# Patient Record
Sex: MALE | Race: WHITE | NOT HISPANIC OR LATINO
[De-identification: names, ages, dates, MRNs, and addresses within clinical notes are randomized per-mention and may not be internally consistent; named-entity substitution may affect disease eponyms.]

---

## 2017-11-17 PROBLEM — Z00.00 ENCOUNTER FOR PREVENTIVE HEALTH EXAMINATION: Status: ACTIVE | Noted: 2017-11-17

## 2017-11-27 ENCOUNTER — APPOINTMENT (OUTPATIENT)
Dept: INTERNAL MEDICINE | Facility: CLINIC | Age: 27
End: 2017-11-27

## 2019-05-09 ENCOUNTER — INPATIENT (INPATIENT)
Facility: HOSPITAL | Age: 29
LOS: 0 days | Discharge: LEFT BEFORE DISCHARGE | End: 2019-05-10
Attending: INTERNAL MEDICINE | Admitting: INTERNAL MEDICINE

## 2019-05-09 VITALS
RESPIRATION RATE: 16 BRPM | HEIGHT: 64 IN | TEMPERATURE: 99 F | SYSTOLIC BLOOD PRESSURE: 135 MMHG | HEART RATE: 95 BPM | DIASTOLIC BLOOD PRESSURE: 59 MMHG | WEIGHT: 130.07 LBS

## 2019-05-09 DIAGNOSIS — F11.20 OPIOID DEPENDENCE, UNCOMPLICATED: ICD-10-CM

## 2019-05-09 DIAGNOSIS — F32.9 MAJOR DEPRESSIVE DISORDER, SINGLE EPISODE, UNSPECIFIED: ICD-10-CM

## 2019-05-09 DIAGNOSIS — F17.200 NICOTINE DEPENDENCE, UNSPECIFIED, UNCOMPLICATED: ICD-10-CM

## 2019-05-09 LAB
ALBUMIN SERPL ELPH-MCNC: 4.4 G/DL — SIGNIFICANT CHANGE UP (ref 3.5–5.2)
ALP SERPL-CCNC: 96 U/L — SIGNIFICANT CHANGE UP (ref 30–115)
ALT FLD-CCNC: 12 U/L — SIGNIFICANT CHANGE UP (ref 0–41)
AMPHET UR-MCNC: NEGATIVE — SIGNIFICANT CHANGE UP
ANION GAP SERPL CALC-SCNC: 11 MMOL/L — SIGNIFICANT CHANGE UP (ref 7–14)
APPEARANCE UR: CLEAR — SIGNIFICANT CHANGE UP
AST SERPL-CCNC: 15 U/L — SIGNIFICANT CHANGE UP (ref 0–41)
BACTERIA # UR AUTO: ABNORMAL
BARBITURATES UR SCN-MCNC: NEGATIVE — SIGNIFICANT CHANGE UP
BASOPHILS # BLD AUTO: 0.02 K/UL — SIGNIFICANT CHANGE UP (ref 0–0.2)
BASOPHILS NFR BLD AUTO: 0.3 % — SIGNIFICANT CHANGE UP (ref 0–1)
BENZODIAZ UR-MCNC: NEGATIVE — SIGNIFICANT CHANGE UP
BILIRUB SERPL-MCNC: 0.4 MG/DL — SIGNIFICANT CHANGE UP (ref 0.2–1.2)
BILIRUB UR-MCNC: ABNORMAL
BUN SERPL-MCNC: 13 MG/DL — SIGNIFICANT CHANGE UP (ref 10–20)
CALCIUM SERPL-MCNC: 9.5 MG/DL — SIGNIFICANT CHANGE UP (ref 8.5–10.1)
CHLORIDE SERPL-SCNC: 102 MMOL/L — SIGNIFICANT CHANGE UP (ref 98–110)
CHOLEST SERPL-MCNC: 167 MG/DL — SIGNIFICANT CHANGE UP (ref 100–200)
CO2 SERPL-SCNC: 28 MMOL/L — SIGNIFICANT CHANGE UP (ref 17–32)
COCAINE METAB.OTHER UR-MCNC: NEGATIVE — SIGNIFICANT CHANGE UP
COLOR SPEC: YELLOW — SIGNIFICANT CHANGE UP
COMMENT - URINE: SIGNIFICANT CHANGE UP
CREAT SERPL-MCNC: 0.8 MG/DL — SIGNIFICANT CHANGE UP (ref 0.7–1.5)
DIFF PNL FLD: NEGATIVE — SIGNIFICANT CHANGE UP
DRUG SCREEN 1, URINE RESULT: SIGNIFICANT CHANGE UP
EOSINOPHIL # BLD AUTO: 0.09 K/UL — SIGNIFICANT CHANGE UP (ref 0–0.7)
EOSINOPHIL NFR BLD AUTO: 1.2 % — SIGNIFICANT CHANGE UP (ref 0–8)
EPI CELLS # UR: ABNORMAL /HPF
GGT SERPL-CCNC: 16 U/L — SIGNIFICANT CHANGE UP (ref 1–40)
GLUCOSE SERPL-MCNC: 92 MG/DL — SIGNIFICANT CHANGE UP (ref 70–99)
GLUCOSE UR QL: NEGATIVE MG/DL — SIGNIFICANT CHANGE UP
HCT VFR BLD CALC: 42.5 % — SIGNIFICANT CHANGE UP (ref 42–52)
HDLC SERPL-MCNC: 43 MG/DL — SIGNIFICANT CHANGE UP
HGB BLD-MCNC: 14.3 G/DL — SIGNIFICANT CHANGE UP (ref 14–18)
IMM GRANULOCYTES NFR BLD AUTO: 0.3 % — SIGNIFICANT CHANGE UP (ref 0.1–0.3)
KETONES UR-MCNC: ABNORMAL
LEUKOCYTE ESTERASE UR-ACNC: NEGATIVE — SIGNIFICANT CHANGE UP
LIPID PNL WITH DIRECT LDL SERPL: 110 MG/DL — SIGNIFICANT CHANGE UP (ref 4–129)
LYMPHOCYTES # BLD AUTO: 2.1 K/UL — SIGNIFICANT CHANGE UP (ref 1.2–3.4)
LYMPHOCYTES # BLD AUTO: 27 % — SIGNIFICANT CHANGE UP (ref 20.5–51.1)
MAGNESIUM SERPL-MCNC: 2.3 MG/DL — SIGNIFICANT CHANGE UP (ref 1.8–2.4)
MCHC RBC-ENTMCNC: 29.1 PG — SIGNIFICANT CHANGE UP (ref 27–31)
MCHC RBC-ENTMCNC: 33.6 G/DL — SIGNIFICANT CHANGE UP (ref 32–37)
MCV RBC AUTO: 86.4 FL — SIGNIFICANT CHANGE UP (ref 80–94)
METHADONE UR-MCNC: NEGATIVE — SIGNIFICANT CHANGE UP
MONOCYTES # BLD AUTO: 0.48 K/UL — SIGNIFICANT CHANGE UP (ref 0.1–0.6)
MONOCYTES NFR BLD AUTO: 6.2 % — SIGNIFICANT CHANGE UP (ref 1.7–9.3)
NEUTROPHILS # BLD AUTO: 5.07 K/UL — SIGNIFICANT CHANGE UP (ref 1.4–6.5)
NEUTROPHILS NFR BLD AUTO: 65 % — SIGNIFICANT CHANGE UP (ref 42.2–75.2)
NITRITE UR-MCNC: NEGATIVE — SIGNIFICANT CHANGE UP
NRBC # BLD: 0 /100 WBCS — SIGNIFICANT CHANGE UP (ref 0–0)
OPIATES UR-MCNC: POSITIVE
PCP UR-MCNC: NEGATIVE — SIGNIFICANT CHANGE UP
PH UR: 6 — SIGNIFICANT CHANGE UP (ref 5–8)
PLATELET # BLD AUTO: 323 K/UL — SIGNIFICANT CHANGE UP (ref 130–400)
POTASSIUM SERPL-MCNC: 4.4 MMOL/L — SIGNIFICANT CHANGE UP (ref 3.5–5)
POTASSIUM SERPL-SCNC: 4.4 MMOL/L — SIGNIFICANT CHANGE UP (ref 3.5–5)
PROPOXYPHENE QUALITATIVE URINE RESULT: NEGATIVE — SIGNIFICANT CHANGE UP
PROT SERPL-MCNC: 6.6 G/DL — SIGNIFICANT CHANGE UP (ref 6–8)
PROT UR-MCNC: 30 MG/DL
RBC # BLD: 4.92 M/UL — SIGNIFICANT CHANGE UP (ref 4.7–6.1)
RBC # FLD: 12.1 % — SIGNIFICANT CHANGE UP (ref 11.5–14.5)
RBC CASTS # UR COMP ASSIST: NEGATIVE — SIGNIFICANT CHANGE UP
SODIUM SERPL-SCNC: 141 MMOL/L — SIGNIFICANT CHANGE UP (ref 135–146)
SP GR SPEC: >=1.03 (ref 1.01–1.03)
THC UR QL: NEGATIVE — SIGNIFICANT CHANGE UP
TOTAL CHOLESTEROL/HDL RATIO MEASUREMENT: 3.9 RATIO — LOW (ref 4–5.5)
TRIGL SERPL-MCNC: 115 MG/DL — SIGNIFICANT CHANGE UP (ref 10–149)
UROBILINOGEN FLD QL: 1 MG/DL (ref 0.2–0.2)
WBC # BLD: 7.78 K/UL — SIGNIFICANT CHANGE UP (ref 4.8–10.8)
WBC # FLD AUTO: 7.78 K/UL — SIGNIFICANT CHANGE UP (ref 4.8–10.8)
WBC UR QL: SIGNIFICANT CHANGE UP /HPF

## 2019-05-09 RX ORDER — ACETAMINOPHEN 500 MG
650 TABLET ORAL EVERY 4 HOURS
Refills: 0 | Status: DISCONTINUED | OUTPATIENT
Start: 2019-05-09 | End: 2019-05-10

## 2019-05-09 RX ORDER — METHADONE HYDROCHLORIDE 40 MG/1
TABLET ORAL
Refills: 0 | Status: DISCONTINUED | OUTPATIENT
Start: 2019-05-09 | End: 2019-05-10

## 2019-05-09 RX ORDER — METHADONE HYDROCHLORIDE 40 MG/1
15 TABLET ORAL EVERY 12 HOURS
Refills: 0 | Status: DISCONTINUED | OUTPATIENT
Start: 2019-05-09 | End: 2019-05-10

## 2019-05-09 RX ORDER — METHADONE HYDROCHLORIDE 40 MG/1
15 TABLET ORAL ONCE
Refills: 0 | Status: DISCONTINUED | OUTPATIENT
Start: 2019-05-09 | End: 2019-05-09

## 2019-05-09 RX ORDER — NICOTINE POLACRILEX 2 MG
1 GUM BUCCAL DAILY
Refills: 0 | Status: DISCONTINUED | OUTPATIENT
Start: 2019-05-09 | End: 2019-05-10

## 2019-05-09 RX ORDER — METHADONE HYDROCHLORIDE 40 MG/1
5 TABLET ORAL EVERY 12 HOURS
Refills: 0 | Status: CANCELLED | OUTPATIENT
Start: 2019-05-12 | End: 2019-05-10

## 2019-05-09 RX ORDER — METHOCARBAMOL 500 MG/1
500 TABLET, FILM COATED ORAL EVERY 6 HOURS
Refills: 0 | Status: DISCONTINUED | OUTPATIENT
Start: 2019-05-09 | End: 2019-05-10

## 2019-05-09 RX ORDER — IBUPROFEN 200 MG
400 TABLET ORAL EVERY 6 HOURS
Refills: 0 | Status: DISCONTINUED | OUTPATIENT
Start: 2019-05-09 | End: 2019-05-10

## 2019-05-09 RX ORDER — MULTIVIT-MIN/FERROUS GLUCONATE 9 MG/15 ML
1 LIQUID (ML) ORAL DAILY
Refills: 0 | Status: DISCONTINUED | OUTPATIENT
Start: 2019-05-09 | End: 2019-05-10

## 2019-05-09 RX ORDER — HYDROXYZINE HCL 10 MG
100 TABLET ORAL AT BEDTIME
Refills: 0 | Status: DISCONTINUED | OUTPATIENT
Start: 2019-05-09 | End: 2019-05-10

## 2019-05-09 RX ORDER — HYDROXYZINE HCL 10 MG
50 TABLET ORAL EVERY 6 HOURS
Refills: 0 | Status: DISCONTINUED | OUTPATIENT
Start: 2019-05-09 | End: 2019-05-10

## 2019-05-09 RX ORDER — METHADONE HYDROCHLORIDE 40 MG/1
10 TABLET ORAL EVERY 12 HOURS
Refills: 0 | Status: DISCONTINUED | OUTPATIENT
Start: 2019-05-10 | End: 2019-05-10

## 2019-05-09 RX ORDER — PSEUDOEPHEDRINE HCL 30 MG
60 TABLET ORAL EVERY 6 HOURS
Refills: 0 | Status: DISCONTINUED | OUTPATIENT
Start: 2019-05-09 | End: 2019-05-10

## 2019-05-09 RX ORDER — MAGNESIUM HYDROXIDE 400 MG/1
30 TABLET, CHEWABLE ORAL ONCE
Refills: 0 | Status: DISCONTINUED | OUTPATIENT
Start: 2019-05-09 | End: 2019-05-10

## 2019-05-09 RX ORDER — GUAIFENESIN/DEXTROMETHORPHAN 600MG-30MG
5 TABLET, EXTENDED RELEASE 12 HR ORAL EVERY 4 HOURS
Refills: 0 | Status: DISCONTINUED | OUTPATIENT
Start: 2019-05-09 | End: 2019-05-10

## 2019-05-09 RX ADMIN — METHADONE HYDROCHLORIDE 15 MILLIGRAM(S): 40 TABLET ORAL at 14:52

## 2019-05-09 RX ADMIN — Medication 1 PATCH: at 19:49

## 2019-05-09 RX ADMIN — METHADONE HYDROCHLORIDE 15 MILLIGRAM(S): 40 TABLET ORAL at 21:04

## 2019-05-09 RX ADMIN — Medication 1 PATCH: at 14:53

## 2019-05-09 RX ADMIN — Medication 1 TABLET(S): at 14:52

## 2019-05-09 NOTE — H&P ADULT - NSHPPHYSICALEXAM_GEN_ALL_CORE
-  Vital Signs:      Temp:  98.6     Pulse: 102        RR: 16       BP: 110/90     Physical Exam:              Constitutional: +Anxious A&Ox3, W/N and W/D.  HEENT: NC/AT, PERRLA, EOM Intact, Nares normal, No Sinus tenderness.  Lips, mucosa and tongue normal; Neck supple, No adenopathy  Respiratory: CTAB, no rales, no rhonchi, no wheezes  Cardiovascular: +S1S2, No M/R/G  Gastrointestinal: +BS, soft, non-tender, not distended, No CVAT  Extremities: Atraumatic, no cyanosis, no edema, no calf tenderness,  Vascular: +dorsal pedis and radial pulses, no extremity cyanosis  Neurological: sensation intact, ROM equal B/L, CN II-XII intact, Gait: steady  Skin: tattoos seen all over extremities and torso area, no rashes, no lesions, normal turgor, No track marks  No Decubiti present  No IV lines present  Rectal/Breasts Exam: Deferred
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
## 2019-05-09 NOTE — H&P ADULT - PROBLEM SELECTOR PROBLEM 3
call if not void in 8 hours , for excessive bleeding, pain ,swelling or fever greater than 101.
Depression
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
## 2019-05-10 VITALS
HEART RATE: 101 BPM | DIASTOLIC BLOOD PRESSURE: 67 MMHG | TEMPERATURE: 99 F | RESPIRATION RATE: 16 BRPM | SYSTOLIC BLOOD PRESSURE: 118 MMHG

## 2019-05-10 LAB
ESTIMATED AVERAGE GLUCOSE: 120 MG/DL — HIGH (ref 68–114)
HAV IGM SER-ACNC: SIGNIFICANT CHANGE UP
HBA1C BLD-MCNC: 5.8 % — HIGH (ref 4–5.6)
HBV CORE IGM SER-ACNC: SIGNIFICANT CHANGE UP
HBV SURFACE AG SER-ACNC: SIGNIFICANT CHANGE UP
HCV AB S/CO SERPL IA: 0.08 S/CO — SIGNIFICANT CHANGE UP (ref 0–0.99)
HCV AB SERPL-IMP: SIGNIFICANT CHANGE UP
HIV 1+2 AB+HIV1 P24 AG SERPL QL IA: SIGNIFICANT CHANGE UP
T PALLIDUM AB TITR SER: NEGATIVE — SIGNIFICANT CHANGE UP

## 2019-05-10 RX ORDER — BUPRENORPHINE AND NALOXONE 2; .5 MG/1; MG/1
1 TABLET SUBLINGUAL
Qty: 0 | Refills: 0 | DISCHARGE

## 2019-05-10 RX ADMIN — Medication 1 PATCH: at 12:29

## 2019-05-10 RX ADMIN — Medication 1 TABLET(S): at 08:23

## 2019-05-10 RX ADMIN — Medication 1 PATCH: at 08:23

## 2019-05-10 RX ADMIN — Medication 100 MILLIGRAM(S): at 01:04

## 2019-05-10 RX ADMIN — Medication 1 PATCH: at 06:21

## 2019-05-10 RX ADMIN — METHADONE HYDROCHLORIDE 15 MILLIGRAM(S): 40 TABLET ORAL at 08:22

## 2019-05-11 NOTE — CHART NOTE - NSCHARTNOTEFT_GEN_A_CORE
The patient was admitted to the inpt detox unit CDU, for   ETOH____ Opioid_x__  Benzo____Polysubstance _____ Dependency.    Pt was admitted from ED____, Intake_x___, Med/surg Floor_______.    Details are present in the preceding History & Physical section and follow up chart notes.  patient was evaluated on daily detox team  rounds.  Withdrawal symptoms and signs were reviewed on a daily basis, and the protocols were adjusted accordingly.    Labs and imaging results were reviewed and discussed with the patient.    All questions from the patient were addressed.  The patient was seen by the Chemical dependency counselors, and different options for after care were discussed.  The patient attended groups, meetings and 1:1 sessions with the counselors.  Narcane Kit was offered and instructions given prior to discharge.    Psychiatry consultation reviewed______, N/A__x____    Physical therapy evaluation reviewed_____, N/A_x___    Pt was given copies of labs and imaging reports, if applicable.    Prescriptions if needed, were sent through [facility] system to the pharmacy amnd are noted in the discharge instruction sheet.    After care was arranged by counselors and pt was discharged to:    Home___, Outpt. Program___, Rehab ___, Long term____ [facility] ____ [facility]PP____ SNF____, AMA_x__, Admin Discharge____    Principal Diagnosis: Alcohol Dependency____ Opioid Dependency_x__ Benzo Dependency____ Polysubstance Dependency____

## 2019-05-15 DIAGNOSIS — G47.00 INSOMNIA, UNSPECIFIED: ICD-10-CM

## 2019-05-15 DIAGNOSIS — F32.9 MAJOR DEPRESSIVE DISORDER, SINGLE EPISODE, UNSPECIFIED: ICD-10-CM

## 2019-05-15 DIAGNOSIS — F17.210 NICOTINE DEPENDENCE, CIGARETTES, UNCOMPLICATED: ICD-10-CM

## 2019-05-15 DIAGNOSIS — Z59.0 HOMELESSNESS: ICD-10-CM

## 2019-05-15 DIAGNOSIS — F11.20 OPIOID DEPENDENCE, UNCOMPLICATED: ICD-10-CM

## 2019-05-15 LAB
GAMMA INTERFERON BACKGROUND BLD IA-ACNC: 0.02 IU/ML — SIGNIFICANT CHANGE UP
M TB IFN-G BLD-IMP: NEGATIVE — SIGNIFICANT CHANGE UP
M TB IFN-G CD4+ BCKGRND COR BLD-ACNC: 0 IU/ML — SIGNIFICANT CHANGE UP
M TB IFN-G CD4+CD8+ BCKGRND COR BLD-ACNC: 0 IU/ML — SIGNIFICANT CHANGE UP
QUANT TB PLUS MITOGEN MINUS NIL: >10 IU/ML — SIGNIFICANT CHANGE UP

## 2019-05-15 SDOH — ECONOMIC STABILITY - HOUSING INSECURITY: HOMELESSNESS: Z59.0

## 2020-11-05 ENCOUNTER — INPATIENT (INPATIENT)
Facility: HOSPITAL | Age: 30
LOS: 4 days | Discharge: HOME | End: 2020-11-10
Attending: PSYCHIATRY & NEUROLOGY | Admitting: PSYCHIATRY & NEUROLOGY
Payer: MEDICAID

## 2020-11-05 VITALS — HEIGHT: 64 IN | WEIGHT: 139.99 LBS

## 2020-11-05 LAB
ANION GAP SERPL CALC-SCNC: 64 MMOL/L — HIGH (ref 7–14)
APAP SERPL-MCNC: <5 UG/ML — LOW (ref 10–30)
BASOPHILS # BLD AUTO: 0.02 K/UL — SIGNIFICANT CHANGE UP (ref 0–0.2)
BASOPHILS NFR BLD AUTO: 0.3 % — SIGNIFICANT CHANGE UP (ref 0–1)
BUN SERPL-MCNC: 17 MG/DL — SIGNIFICANT CHANGE UP (ref 10–20)
CALCIUM SERPL-MCNC: 8.5 MG/DL — SIGNIFICANT CHANGE UP (ref 8.5–10.1)
CHLORIDE SERPL-SCNC: 60 MMOL/L — LOW (ref 98–110)
CO2 SERPL-SCNC: 27 MMOL/L — SIGNIFICANT CHANGE UP (ref 17–32)
CREAT SERPL-MCNC: 0.7 MG/DL — SIGNIFICANT CHANGE UP (ref 0.7–1.5)
EOSINOPHIL # BLD AUTO: 0.19 K/UL — SIGNIFICANT CHANGE UP (ref 0–0.7)
EOSINOPHIL NFR BLD AUTO: 2.4 % — SIGNIFICANT CHANGE UP (ref 0–8)
ETHANOL SERPL-MCNC: <10 MG/DL — SIGNIFICANT CHANGE UP
GLUCOSE SERPL-MCNC: 104 MG/DL — HIGH (ref 70–99)
HCT VFR BLD CALC: 39 % — LOW (ref 42–52)
HGB BLD-MCNC: 13 G/DL — LOW (ref 14–18)
IMM GRANULOCYTES NFR BLD AUTO: 0.1 % — SIGNIFICANT CHANGE UP (ref 0.1–0.3)
LYMPHOCYTES # BLD AUTO: 3.06 K/UL — SIGNIFICANT CHANGE UP (ref 1.2–3.4)
LYMPHOCYTES # BLD AUTO: 38.4 % — SIGNIFICANT CHANGE UP (ref 20.5–51.1)
MCHC RBC-ENTMCNC: 29.1 PG — SIGNIFICANT CHANGE UP (ref 27–31)
MCHC RBC-ENTMCNC: 33.3 G/DL — SIGNIFICANT CHANGE UP (ref 32–37)
MCV RBC AUTO: 87.2 FL — SIGNIFICANT CHANGE UP (ref 80–94)
MONOCYTES # BLD AUTO: 0.51 K/UL — SIGNIFICANT CHANGE UP (ref 0.1–0.6)
MONOCYTES NFR BLD AUTO: 6.4 % — SIGNIFICANT CHANGE UP (ref 1.7–9.3)
NEUTROPHILS # BLD AUTO: 4.18 K/UL — SIGNIFICANT CHANGE UP (ref 1.4–6.5)
NEUTROPHILS NFR BLD AUTO: 52.4 % — SIGNIFICANT CHANGE UP (ref 42.2–75.2)
NRBC # BLD: 0 /100 WBCS — SIGNIFICANT CHANGE UP (ref 0–0)
PLATELET # BLD AUTO: 304 K/UL — SIGNIFICANT CHANGE UP (ref 130–400)
POTASSIUM SERPL-MCNC: 5.1 MMOL/L — HIGH (ref 3.5–5)
POTASSIUM SERPL-SCNC: 5.1 MMOL/L — HIGH (ref 3.5–5)
RBC # BLD: 4.47 M/UL — LOW (ref 4.7–6.1)
RBC # FLD: 12.8 % — SIGNIFICANT CHANGE UP (ref 11.5–14.5)
SALICYLATES SERPL-MCNC: <0.3 MG/DL — LOW (ref 4–30)
SODIUM SERPL-SCNC: 151 MMOL/L — HIGH (ref 135–146)
WBC # BLD: 7.97 K/UL — SIGNIFICANT CHANGE UP (ref 4.8–10.8)
WBC # FLD AUTO: 7.97 K/UL — SIGNIFICANT CHANGE UP (ref 4.8–10.8)

## 2020-11-05 PROCEDURE — 90792 PSYCH DIAG EVAL W/MED SRVCS: CPT | Mod: 95

## 2020-11-05 PROCEDURE — 99284 EMERGENCY DEPT VISIT MOD MDM: CPT

## 2020-11-05 NOTE — ED PROVIDER NOTE - ATTENDING CONTRIBUTION TO CARE
30yM p/w SI/depression in the setting of recurrent polysubstance abuse (heroin, cocaine, last used 1wk ago).  No fever, CP, cough, abd pain, n/v/d.  No HI or AVH.    CONSTITUTIONAL: well developed; well nourished; well appearing in no acute distress  HEAD: normocephalic; atraumatic  EYES: no conjunctival injection, no scleral icterus  ENT: no nasal discharge; airway clear.  NECK: supple; non tender. + full passive ROM in all directions  CARD: S1, S2 normal; no murmurs, gallops, or rubs. Regular rate and rhythm  RESP: no wheezes, rales or rhonchi. Good air movement bilaterally without significant accessory muscle use  EXT: moving all extremities spontaneously, normal ROM. No clubbing, cyanosis or edema  SKIN: warm and dry, no lesions noted  NEURO: alert, oriented, CN II-XII grossly intact, motor and sensory grossly intact, speech nonslurred, no focal deficits. GCS 15  PSYCH: calm, cooperative, appropriate, good eye contact, logical thought process, no apparent danger to self or others
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
## 2020-11-05 NOTE — ED ADULT TRIAGE NOTE - CHIEF COMPLAINT QUOTE
pt states, "I'm suicidal, really a lot the last week, the last 3 days" pt states he had a plan but did not go through with it, "I'm a recovering drug addict" states he has never attempted suicide. denies homicidal ideations to others

## 2020-11-05 NOTE — ED BEHAVIORAL HEALTH ASSESSMENT NOTE - SUICIDE RISK FACTORS
Alcohol/Substance abuse disorders/Access to lethal methods (pills, firearm, etc.: Ask specifically about presence or absence of a firearm in the home or ease of accessing

## 2020-11-05 NOTE — ED PROVIDER NOTE - OBJECTIVE STATEMENT
30 year old male, past medical history substance use disorder, depression, who presents with SI with plan. patient reports hx IVDU, was on suboxone, states he recently was discharged from rehab, has since been feeling anxious with thoughts of hurting himself. states he wanted to "take enough pills to die." Denies alcohol or drug use. No auditory/visual hallucinations, homicidal ideation.

## 2020-11-05 NOTE — ED PROVIDER NOTE - NS ED ROS FT
Review of Systems:  	•	CONSTITUTIONAL: no fever, no diaphoresis, no chills  	•	SKIN: no rash  	•	RESPIRATORY: no shortness of breath, no cough  	•	CARDIAC: no chest pain, no palpitations  	•	GI: no abd pain, no nausea, no vomiting, no diarrhea  	•	MUSCULOSKELETAL: no joint paint, no swelling, no redness  	•	NEUROLOGIC: no weakness, no headache, no paresthesias, no LOC  	•	PSYCH: +depression, +SI. no hi, no anxiety
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
## 2020-11-05 NOTE — ED PROVIDER NOTE - PHYSICAL EXAMINATION
CONSTITUTIONAL: Well-developed; well-nourished; in no acute distress, nontoxic appearing  SKIN: skin exam is warm and dry,  HEAD: Normocephalic; atraumatic.  NECK: ROM intact   CARD: S1, S2 normal, no murmur  RESP: No wheezes, rales or rhonchi. Good air movement bilaterally  EXT: Normal ROM.  NEURO: awake, alert, following commands, oriented, grossly unremarkable. No Focal deficits. GCS 15.   PSYCH:

## 2020-11-05 NOTE — ED PROVIDER NOTE - CARE PLAN
Principal Discharge DX:	Suicidal ideation  Secondary Diagnosis:	Cocaine use disorder  Secondary Diagnosis:	Opioid use disorder  Secondary Diagnosis:	Depression

## 2020-11-05 NOTE — ED PROVIDER NOTE - CLINICAL SUMMARY MEDICAL DECISION MAKING FREE TEXT BOX
30yM depression polysubstance abuse p/w SI.  Labs and EKG reassuring.  Pt evaluated by telepsych and planned for voluntary admission to [facility] psychiatry.  Pt not violent or aggressive and did not require any prn medications.

## 2020-11-06 DIAGNOSIS — F14.10 COCAINE ABUSE, UNCOMPLICATED: ICD-10-CM

## 2020-11-06 DIAGNOSIS — F11.99 OPIOID USE, UNSPECIFIED WITH UNSPECIFIED OPIOID-INDUCED DISORDER: ICD-10-CM

## 2020-11-06 DIAGNOSIS — R45.851 SUICIDAL IDEATIONS: ICD-10-CM

## 2020-11-06 DIAGNOSIS — F19.90 OTHER PSYCHOACTIVE SUBSTANCE USE, UNSPECIFIED, UNCOMPLICATED: ICD-10-CM

## 2020-11-06 DIAGNOSIS — F17.200 NICOTINE DEPENDENCE, UNSPECIFIED, UNCOMPLICATED: ICD-10-CM

## 2020-11-06 DIAGNOSIS — F32.9 MAJOR DEPRESSIVE DISORDER, SINGLE EPISODE, UNSPECIFIED: ICD-10-CM

## 2020-11-06 LAB
A1C WITH ESTIMATED AVERAGE GLUCOSE RESULT: 5.6 % — SIGNIFICANT CHANGE UP (ref 4–5.6)
AMPHET UR-MCNC: SIGNIFICANT CHANGE UP
BARBITURATES UR SCN-MCNC: SIGNIFICANT CHANGE UP
BENZODIAZ UR-MCNC: SIGNIFICANT CHANGE UP
CHOLEST SERPL-MCNC: 180 MG/DL — SIGNIFICANT CHANGE UP
COCAINE METAB.OTHER UR-MCNC: SIGNIFICANT CHANGE UP
DRUG SCREEN 1, URINE RESULT: SIGNIFICANT CHANGE UP
ESTIMATED AVERAGE GLUCOSE: 114 MG/DL — SIGNIFICANT CHANGE UP (ref 68–114)
HDLC SERPL-MCNC: 67 MG/DL — SIGNIFICANT CHANGE UP
LIPID PNL WITH DIRECT LDL SERPL: 120 MG/DL — HIGH
METHADONE UR-MCNC: SIGNIFICANT CHANGE UP
NON HDL CHOLESTEROL: 113 MG/DL — SIGNIFICANT CHANGE UP
OPIATES UR-MCNC: SIGNIFICANT CHANGE UP
PCP UR-MCNC: SIGNIFICANT CHANGE UP
PROPOXYPHENE QUALITATIVE URINE RESULT: SIGNIFICANT CHANGE UP
RAPID RVP RESULT: SIGNIFICANT CHANGE UP
SARS-COV-2 RNA SPEC QL NAA+PROBE: SIGNIFICANT CHANGE UP
THC UR QL: SIGNIFICANT CHANGE UP
TRIGL SERPL-MCNC: 76 MG/DL — SIGNIFICANT CHANGE UP

## 2020-11-06 PROCEDURE — 99232 SBSQ HOSP IP/OBS MODERATE 35: CPT

## 2020-11-06 RX ORDER — HALOPERIDOL DECANOATE 100 MG/ML
5 INJECTION INTRAMUSCULAR EVERY 6 HOURS
Refills: 0 | Status: DISCONTINUED | OUTPATIENT
Start: 2020-11-06 | End: 2020-11-06

## 2020-11-06 RX ORDER — INFLUENZA VIRUS VACCINE 15; 15; 15; 15 UG/.5ML; UG/.5ML; UG/.5ML; UG/.5ML
0.5 SUSPENSION INTRAMUSCULAR ONCE
Refills: 0 | Status: COMPLETED | OUTPATIENT
Start: 2020-11-06 | End: 2020-11-06

## 2020-11-06 RX ORDER — NICOTINE POLACRILEX 2 MG
1 GUM BUCCAL DAILY
Refills: 0 | Status: DISCONTINUED | OUTPATIENT
Start: 2020-11-06 | End: 2020-11-06

## 2020-11-06 RX ORDER — HYDROXYZINE HCL 10 MG
50 TABLET ORAL EVERY 6 HOURS
Refills: 0 | Status: DISCONTINUED | OUTPATIENT
Start: 2020-11-06 | End: 2020-11-10

## 2020-11-06 RX ORDER — NICOTINE POLACRILEX 2 MG
2 GUM BUCCAL
Refills: 0 | Status: DISCONTINUED | OUTPATIENT
Start: 2020-11-06 | End: 2020-11-10

## 2020-11-06 RX ORDER — DIPHENHYDRAMINE HCL 50 MG
50 CAPSULE ORAL EVERY 6 HOURS
Refills: 0 | Status: DISCONTINUED | OUTPATIENT
Start: 2020-11-06 | End: 2020-11-06

## 2020-11-06 RX ORDER — HALOPERIDOL DECANOATE 100 MG/ML
5 INJECTION INTRAMUSCULAR EVERY 6 HOURS
Refills: 0 | Status: DISCONTINUED | OUTPATIENT
Start: 2020-11-06 | End: 2020-11-10

## 2020-11-06 RX ADMIN — Medication 2 MILLIGRAM(S): at 15:45

## 2020-11-06 RX ADMIN — Medication 2 MILLIGRAM(S): at 18:11

## 2020-11-06 NOTE — PROGRESS NOTE BEHAVIORAL HEALTH - NSBHCHARTREVIEWVS_PSY_A_CORE FT
Vital Signs Last 24 Hrs  T(C): 35.9 (06 Nov 2020 06:07), Max: 36.1 (05 Nov 2020 21:48)  T(F): 96.6 (06 Nov 2020 06:07), Max: 97 (05 Nov 2020 21:48)  HR: 88 (05 Nov 2020 21:48) (88 - 93)  BP: 136/76 (05 Nov 2020 21:48) (136/76 - 159/98)  BP(mean): --  RR: 18 (06 Nov 2020 06:07) (18 - 18)  SpO2: 98% (05 Nov 2020 21:48) (98% - 99%)

## 2020-11-06 NOTE — H&P ADULT - NSHPPHYSICALEXAM_GEN_ALL_CORE
Vital Signs Last 24 Hrs  T(C): 35.9 (11-06-20 @ 06:07)  T(F): 96.6 (11-06-20 @ 06:07), Max: 97 (11-05-20 @ 21:48)  HR: 88 (11-05-20 @ 21:48) (88 - 93)  BP: 136/76 (11-05-20 @ 21:48)  BP(mean): --  RR: 18 (11-06-20 @ 06:07) (18 - 18)  SpO2: 98% (11-05-20 @ 21:48) (98% - 99%)  Wt(kg): --

## 2020-11-06 NOTE — H&P ADULT - NSHPLABSRESULTS_GEN_ALL_CORE
13.0   7.97  )-----------( 304      ( 05 Nov 2020 19:58 )             39.0       11-05    141  |  102  |  17  ----------------------------<  104<H>  3.9   |  27  |  0.7    Ca    9.0      05 Nov 2020 19:58                        Lactate Trend            CAPILLARY BLOOD GLUCOSE

## 2020-11-06 NOTE — CONSULT NOTE ADULT - SUBJECTIVE AND OBJECTIVE BOX
LINDA ALEGRE  30y, Male  Allergy: No Known Allergies    Hospital Day:     Patient seen and examined earlier today.     PMH/PSH:  PAST MEDICAL & SURGICAL HISTORY:  Depression    Nicotine dependence    No significant past surgical history        LAST 24-Hr EVENTS:    VITALS:  T(F): 96.6 (11-06-20 @ 06:07), Max: 97 (11-05-20 @ 21:48)  HR: 88 (11-05-20 @ 21:48)  BP: 136/76 (11-05-20 @ 21:48) (136/76 - 159/98)  RR: 18 (11-06-20 @ 06:07)  SpO2: 98% (11-05-20 @ 21:48)        TESTS & MEASUREMENTS:  Weight (Kg): 60.9 (11-06-20 @ 06:07)  BMI (kg/m2): 23 (11-06)                          13.0   7.97  )-----------( 304      ( 05 Nov 2020 19:58 )             39.0       11-05    141  |  102  |  17  ----------------------------<  104<H>  3.9   |  27  |  0.7    Ca    9.0      05 Nov 2020 19:58                            RADIOLOGY, ECG, & ADDITIONAL TESTS:  12 Lead ECG:   Ventricular Rate 65 BPM    Atrial Rate 65 BPM    P-R Interval 192 ms    QRS Duration 90 ms    Q-T Interval 406 ms    QTC Calculation(Bazett) 422 ms    P Axis 64 degrees    R Axis 83 degrees    T Axis 42 degrees    Diagnosis Line Normal sinus rhythmwith sinus arrhythmia  Normal ECG    Confirmed by GLEN JAQUEZ, LEDA ([id number]) on 11/6/2020 10:49:50 AM (11-06-20 @ 02:35)      RECENT DIAGNOSTIC ORDERS:  Thyroid Stimulating Hormone, Serum: AM Sched. Collection: 07-Nov-2020 04:30 (11-06-20 @ 11:33)  Comprehensive Metabolic Panel: AM Sched. Collection: 07-Nov-2020 04:30 (11-06-20 @ 11:33)  Drug Screen 1, Urine: Routine (11-06-20 @ 10:22)  Diet, Regular (11-06-20 @ 10:21)  COVID-19  Antibody - for prior infection screening: Routine (11-06-20 @ 06:49)      MEDICATIONS:  MEDICATIONS  (STANDING):  influenza   Vaccine 0.5 milliLiter(s) IntraMuscular once    MEDICATIONS  (PRN):  haloperidol     Tablet 5 milliGRAM(s) Oral every 6 hours PRN Agitation  hydrOXYzine hydrochloride 50 milliGRAM(s) Oral every 6 hours PRN Anxiety/insomnia  nicotine  Polacrilex Gum 2 milliGRAM(s) Oral every 2 hours PRN Smoking Cessation      HOME MEDICATIONS:      PHYSICAL EXAM:  GENERAL: A&O x3,  in NAD  HNENT: EOMI, PERRLA    NECK: No LAD/swelling  CHEST/LUNG:  CTAB no wheezes/rales/ronchi  HEART: RRR, No murmurs  ABDOMEN: Soft, NT, ND,  Bowel sounds present  EXTREMITIES:  Warm well perfused, no edema        BITALINDA AGOSTO  30y, Male  Allergy: No Known Allergies      Patient seen and examined earlier today. sleeping in bed - denies any active complaints at this time     PMH/PSH:  PAST MEDICAL & SURGICAL HISTORY:  Depression    Nicotine dependence    No significant past surgical history        LAST 24-Hr EVENTS:    VITALS:  T(F): 96.6 (11-06-20 @ 06:07), Max: 97 (11-05-20 @ 21:48)  HR: 88 (11-05-20 @ 21:48)  BP: 136/76 (11-05-20 @ 21:48) (136/76 - 159/98)  RR: 18 (11-06-20 @ 06:07)  SpO2: 98% (11-05-20 @ 21:48)        TESTS & MEASUREMENTS:  Weight (Kg): 60.9 (11-06-20 @ 06:07)  BMI (kg/m2): 23 (11-06)                          13.0   7.97  )-----------( 304      ( 05 Nov 2020 19:58 )             39.0       11-05    141  |  102  |  17  ----------------------------<  104<H>  3.9   |  27  |  0.7    Ca    9.0      05 Nov 2020 19:58          RADIOLOGY, ECG, & ADDITIONAL TESTS:  12 Lead ECG:   Ventricular Rate 65 BPM    Atrial Rate 65 BPM    P-R Interval 192 ms    QRS Duration 90 ms    Q-T Interval 406 ms    QTC Calculation(Bazett) 422 ms    P Axis 64 degrees    R Axis 83 degrees    T Axis 42 degrees    Diagnosis Line Normal sinus rhythmwith sinus arrhythmia  Normal ECG    Confirmed by LEDA CARROLL MD ([id number]) on 11/6/2020 10:49:50 AM (11-06-20 @ 02:35)      RECENT DIAGNOSTIC ORDERS:  Thyroid Stimulating Hormone, Serum: AM Sched. Collection: 07-Nov-2020 04:30 (11-06-20 @ 11:33)  Comprehensive Metabolic Panel: AM Sched. Collection: 07-Nov-2020 04:30 (11-06-20 @ 11:33)  Drug Screen 1, Urine: Routine (11-06-20 @ 10:22)  Diet, Regular (11-06-20 @ 10:21)  COVID-19  Antibody - for prior infection screening: Routine (11-06-20 @ 06:49)      MEDICATIONS:  MEDICATIONS  (STANDING):  influenza   Vaccine 0.5 milliLiter(s) IntraMuscular once    MEDICATIONS  (PRN):  haloperidol     Tablet 5 milliGRAM(s) Oral every 6 hours PRN Agitation  hydrOXYzine hydrochloride 50 milliGRAM(s) Oral every 6 hours PRN Anxiety/insomnia  nicotine  Polacrilex Gum 2 milliGRAM(s) Oral every 2 hours PRN Smoking Cessation      HOME MEDICATIONS:      PHYSICAL EXAM:  GENERAL: A&O x3,  in NAD  HNENT: EOMI, PERRLA    NECK: No LAD/swelling  CHEST/LUNG:  CTAB no wheezes/rales/ronchi  HEART: RRR, No murmurs  ABDOMEN: Soft, NT, ND,  Bowel sounds present  EXTREMITIES:  Warm well perfused, no edema

## 2020-11-06 NOTE — CHART NOTE - NSCHARTNOTEFT_GEN_A_CORE
Pt. is a 30 year old, single,  male who was admitted due to suicidal ideation.  Pt denies any prior [facility] admissions or history of suicide attempts.  He reports feeling depressed with intermittent suicidal thoughts since his 20's.  Pt. has a history of cocaine and heroin use.  He relapsed 2 days prior to his admission after 1 month of abstinence.  He had been discharged from rehab on that date.  Pt. reported feeling depressed and attempted to overdose on cocaine and heroin in a suicide attempt.  Pt. had [facility] rehab just prior to this admission.  He has had multiple prior substance abuse treatment episodes.  Pt. reports pending legal issues all drug related.  Pt. presented as guarded and refused to provide collateral contact information.    Pt. is not  and does not have children.  He is currently undomiciled.  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  Once stable for discharge, pt. will be referred to the shelter and for outpatient dual focus treatment if pt. does not return to inpatient rehab.    Sexual History-  Pt. identifies as heterosexual    Traumatic Losses-  None reported    Family History-  Pt. is not  and does not have children    History of suicide attempts-  None reported    Substance Use Assessment-  Pt. has a history of heroin and cocaine use    Community Supports-  None reported    Employment-  Pt. is currently unemployed    Life Goals-  Pt. unable to answer    Pt. is not psychiatrically stable for discharge at this time.

## 2020-11-06 NOTE — PROGRESS NOTE BEHAVIORAL HEALTH - NSBHFUPADDHPIFT_PSY_A_CORE
As per chart review, HPI obtained by writer in the emergency room.  Interval CC and HPI in designated sections below.     Patient is a 29 yo male, psychiatric history of no formal dx, no hx of admissions, self harm or SA, significant substance use history of cocaine and heroin use disorders, reports relapse 2 days ago after 1 month of sobriety, got out of detox and reports he used a 'speedball' of cocaine and heroin, legal problems related to substance use (reports has court dates in drug court upcoming, doesn't know dates), hx of incarceration last year for probation violation, reports got into fights when in senior living but no other known hx of aggression, no know trauma hx, formerly employed as a musician prior to covid, BIB self reporting SI to overdose on heroin.    Patient reports feeling chronically suicidal 'since my 20s', reports that he is feeling anxious and depressed, and today had SI to overdose on heroin. He reports that sometimes the SI 'goes away, then comes on stronger.' He reports it 'came on strong a week ago.' He reports 'I'm always depressed, I can't sleep, my appetite is crazy, I can't focus, I'm anxious.' He reported he got out of rehab 2 days ago and that night took heroin and cocaine, he reports in an attempt to die. He reports stressof of having 'a lot of legal issues' and reports he was crying a lot today. He denies psychotic sx, HI, access to a gun. He is willing to sign for voluntary admission.

## 2020-11-06 NOTE — PROGRESS NOTE BEHAVIORAL HEALTH - SUMMARY
Patient is a 31 yo male, psychiatric history of no formal dx, no hx of admissions, self harm or SA, significant substance use history of cocaine and heroin use disorders, reports relapse 2 days ago after 1 month of sobriety, got out of detox and reports he used a 'speedball' of cocaine and heroin, legal problems related to substance use (reports has court dates in drug court upcoming, doesn't know dates), hx of incarceration last year for probation violation, reports got into fights when in nursing home but no other known hx of aggression, no know trauma hx, formerly employed as a musician prior to covid, BIB self reporting SI to overdose on heroin. He reports mood and anxiety symptoms since coming out of [facility] re[facility] 2 days ago and SI with thought to overdose on heroin. He is willing to sign for voluntary inpatient admission    Patient presents as irritable, endorsing increased depression and anxiety. Patient now denies suicidal/homicidal ideations. Patient is not currently connected with outpatient treatment. Although ther may be some suspicion of secondary gain, given patients increase depression and anxiety, recent relapse on substances, inability to cope with psychosocial stressors, recent suicidal ideation with plan and intent, patient would benefit from IPP for safety and stabilization. Patient is a 29 yo male, psychiatric history of no formal dx, no hx of admissions, self harm or SA, significant substance use history of cocaine and heroin use disorders, reports relapse 2 days ago after 1 month of sobriety, got out of detox and reports he used a 'speedball' of cocaine and heroin, legal problems related to substance use (reports has court dates in drug court upcoming, doesn't know dates), hx of incarceration last year for probation violation, reports got into fights when in half-way but no other known hx of aggression, no know trauma hx, formerly employed as a musician prior to covid, BIB self reporting SI to overdose on heroin. He reports mood and anxiety symptoms since coming out of [facility] rehab 2 days ago and SI with thought to overdose on heroin. He is willing to sign for voluntary inpatient admission    Patient presents as irritable, endorsing increased depression and anxiety. Patient now denies suicidal/homicidal ideations. Patient is not currently connected with outpatient treatment. Although ther may be some suspicion of secondary gain, given patients increase depression and anxiety, recent relapse on substances, inability to cope with psychosocial stressors, recent suicidal ideation with plan and intent, patient would benefit from IPP for safety and stabilization.    #Admit Voluntary 9:13    Plan:    #SIMD vs MDD  -Patient declined meds at this time    # Tobacco use disorder  -Nicotine patch    -Haldol 5mg Q6 PRN  -Hydroxyzine 50mg Q6 PRN Patient is a 29 yo male, psychiatric history of no formal dx, no hx of admissions, self harm or SA, significant substance use history of cocaine and heroin use disorders, reports relapse 2 days ago after 1 month of sobriety, got out of detox and reports he used a 'speedball' of cocaine and heroin, legal problems related to substance use (reports has court dates in drug court upcoming, doesn't know dates), hx of incarceration last year for probation violation, reports got into fights when in [facility] but no other known hx of aggression, no know trauma hx, formerly employed as a musician prior to covid, BIB self reporting SI to overdose on heroin. He reports mood and anxiety symptoms since coming out of [facility] rehab 2 days ago and SI with thought to overdose on heroin. He is willing to sign for voluntary inpatient admission    Patient presents as irritable, endorsing increased depression and anxiety. Patient now denies suicidal/homicidal ideations. Patient is not currently connected with outpatient treatment. Although ther may be some suspicion of secondary gain, given patients increase depression and anxiety, recent relapse on substances, inability to cope with psychosocial stressors, recent suicidal ideation with plan and intent, patient would benefit from IPP for safety and stabilization.    #Admit Voluntary 9:13    Plan:    #SIMD vs MDD  -Patient declined meds at this time    # Tobacco use disorder  -Nicotine patch    -Haldol 5mg Q6 PRN  -Hydroxyzine 50mg Q6 PRN    # Handoff  -Addiction consult ordered Patient is a 31 yo male, psychiatric history of no formal dx, no hx of admissions, self harm or SA, significant substance use history of cocaine and heroin use disorders, reports relapse 2 days ago after 1 month of sobriety, got out of detox and reports he used a 'speedball' of cocaine and heroin, legal problems related to substance use (reports has court dates in drug court upcoming, doesn't know dates), hx of incarceration last year for probation violation, reports got into fights when in skilled nursing but no other known hx of aggression, no know trauma hx, formerly employed as a musician prior to covid, BIB self reporting SI to overdose on heroin. He reports mood and anxiety symptoms since coming out of [facility] rehab 2 days ago and SI with thought to overdose on heroin. He is willing to sign for voluntary inpatient admission    Patient presents as irritable, endorsing increased depression and anxiety. Patient now denies suicidal/homicidal ideations. Patient is not currently connected with outpatient treatment. Although there may be some suspicion of secondary gain, given patients increase depression and anxiety, recent relapse on substances, inability to cope with psychosocial stressors, recent suicidal ideation with plan and intent, patient would benefit from IPP for safety and stabilization.    #Admit Voluntary 9:13    Plan:    #SIMD vs MDD  -Patient declined meds at this time    # Tobacco use disorder  -Nicotine patch    -Haldol 5mg Q6 PRN  -Hydroxyzine 50mg Q6 PRN    # Handoff  -Addiction consult ordered Patient is a 29 yo male, psychiatric history of no formal dx, no hx of admissions, self harm or SA, significant substance use history of cocaine and heroin use disorders, reports relapse 2 days ago after 1 month of sobriety, got out of detox and reports he used a 'speedball' of cocaine and heroin, legal problems related to substance use (reports has court dates in drug court upcoming, doesn't know dates), hx of incarceration last year for probation violation, reports got into fights when in detention but no other known hx of aggression, no know trauma hx, formerly employed as a musician prior to covid, BIB self reporting SI to overdose on heroin. He reports mood and anxiety symptoms since coming out of [facility] rehab 2 days ago and SI with thought to overdose on heroin. He is willing to sign for voluntary inpatient admission    Patient presents as irritable, endorsing increased depression and anxiety. Patient now denies suicidal/homicidal ideations. Patient is not currently connected with outpatient treatment. Although there may be some suspicion of secondary gain, given patients increase depression and anxiety, recent relapse on substances, inability to cope with psychosocial stressors, recent suicidal ideation with plan and intent, patient would benefit from IPP for safety and stabilization.    #Admit Voluntary 9:13    Plan:    #SIMD vs MDD  -Patient declined meds at this time    # Tobacco use disorder  -Nicotine patch  -Nicotine gum PRN    -Haldol 5mg Q6 PRN  -Hydroxyzine 50mg Q6 PRN    # Handoff  -Addiction consult ordered Patient is a 29 yo male, psychiatric history of no formal dx, no hx of admissions, self harm or SA, significant substance use history of cocaine and heroin use disorders, reports relapse 2 days ago after 1 month of sobriety, got out of detox and reports he used a 'speedball' of cocaine and heroin, legal problems related to substance use (reports has court dates in drug court upcoming, doesn't know dates), hx of incarceration last year for probation violation, reports got into fights when in senior care but no other known hx of aggression, no know trauma hx, formerly employed as a musician prior to covid, BIB self reporting SI to overdose on heroin. He reports mood and anxiety symptoms since coming out of [facility] rehab 2 days ago and SI with thought to overdose on heroin. He is willing to sign for voluntary inpatient admission    Patient presents as irritable, endorsing increased depression and anxiety. Patient now denies suicidal/homicidal ideations. Patient is not currently connected with outpatient treatment. Although there may be some suspicion of secondary gain, given patients increase depression and anxiety, recent relapse on substances, inability to cope with psychosocial stressors, recent suicidal ideation with plan and intent, patient would benefit from IPP for safety and stabilization.    #Admit Voluntary 9:13    Plan:    #SIMD vs MDD  -Patient declined meds at this time    # Tobacco use disorder  -Nicotine gum PRN    -Haldol 5mg Q6 PRN  -Hydroxyzine 50mg Q6 PRN    # Handoff  -Addiction consult ordered

## 2020-11-06 NOTE — PROGRESS NOTE BEHAVIORAL HEALTH - NSBHFUPINTERVALHXFT_PSY_A_CORE
Patient seen and evaluated, chart reviewed. Patient alert and oriented x3, calm and cooperative Patient seen and evaluated, chart reviewed. On approach patient laying in bed with cover over his head. Patient alert and oriented x3, appeared irritable, minimally cooperative. States he was admitted for suicidal ideations with a plan an intent to OD on "a lot of drugs" Patient denies suicidal ideations at this time. Denies any past suicide attempts. Denies Homicidal ideations. Denies A/V hallucinations. No evidence of psychosis noted. Patient denies any s/s consistent with richie. Patient endorses recent increase in depression and anxiety. When questioned about depression and anxiety at this time states "its ok". When questioned about treating his depression and anxiety, patient declined at this time. Stated " I don't need anything right now" Patient states he has never been diagnosed with any psychiatric illness. Denies any prior psych medication or treatment. States he was recently at [facility] rehab abd decided to leave 2 days ago. When asked why states "I was anxious to leave". Admits to relapsing on drugs the same day. States he last used heroin and cocaine 2 days ago. When questioned regarding withdrawal symptoms, denies any s/s of withdrawal. Again stated "I don't need anything right now" No s/s of withdrawal noted. Denies any hx of withdrawal seizures. Denies any alcohol use. States he smokes 1 pack of cigarettes a day. Patient refused to provide any collateral.    Addiction consult ordered Patient seen and evaluated, chart reviewed. On approach patient laying in bed with cover over his head. Patient alert and oriented x3, appeared irritable, minimally cooperative. States he was admitted for suicidal ideations with a plan an intent to OD on "a lot of drugs" Patient denies suicidal ideations at this time. Denies any past suicide attempts. Denies Homicidal ideations. Denies A/V hallucinations. No evidence of psychosis noted. Patient denies any s/s consistent with richie. Patient endorses recent increase in depression and anxiety. When questioned about depression and anxiety at this time states "its ok". When questioned about treating his depression and anxiety, patient declined at this time. Stated " I don't need anything right now" Patient states he has never been diagnosed with any psychiatric illness. Denies any prior psych medication or treatment. States he was recently at [facility] rehab abd decided to leave 2 days ago. When asked why states "I was anxious to leave". Admits to relapsing on drugs the same day. States he last used heroin and cocaine 2 days ago. When questioned regarding withdrawal symptoms, denies any s/s of withdrawal. Again stated "I don't need anything right now" No s/s of withdrawal noted. Denies any hx of withdrawal seizures. Denies any alcohol use. States he smokes 1 pack of cigarettes a day. Patient refused to provide any collateral.    # Handoff  -Addiction consult ordered Patient seen and evaluated, chart reviewed. On approach patient laying in bed with cover over his head. Patient alert and oriented x3, appeared irritable, minimally cooperative. States he was admitted for suicidal ideations with a plan an intent to OD on "a lot of drugs" Patient denies suicidal ideations at this time. Denies any past suicide attempts. Denies Homicidal ideations. Denies A/V hallucinations. No evidence of psychosis noted. Patient denies any s/s consistent with richie. Patient endorses recent increase in depression and anxiety. When questioned about depression and anxiety at this time states "its ok". When questioned about treating his depression and anxiety, patient declined at this time. Stated " I don't need anything right now" Patient states he has never been diagnosed with any psychiatric illness. Denies any prior psych medication or treatment. Denies any previous inpatient psych admissions. States he was [facility] at [facility] and decided to leave 2 days ago. When asked why states "I was anxious to leave". Admits to relapsing on drugs the same day. States he last used heroin and cocaine 2 days ago with the intent to die. When questioned regarding withdrawal symptoms, denies any s/s of withdrawal. Again stated "I don't need anything right now" No s/s of withdrawal noted. Denies any hx of withdrawal seizures. Denies any alcohol use. States he smokes 1 pack of cigarettes a day. Patient refused to provide any collateral.    # Handoff  -Addiction consult ordered Patient seen and evaluated, chart reviewed. On approach patient laying in bed with cover over his head. Patient alert and oriented x3, irritable, minimally cooperative. States he was admitted for suicidal ideations with a plan an intent to OD on "a lot of drugs" Patient denies suicidal ideations at this time. Denies any past suicide attempts. Denies Homicidal ideations. Denies A/V hallucinations. No evidence of psychosis noted. Patient denies any s/s consistent with richie. Patient endorses recent increase in depression and anxiety. When questioned about depression and anxiety at this time states "its ok". When questioned about treating his depression and anxiety, patient declined at this time. Stated " I don't need anything right now" Patient states he has never been diagnosed with any psychiatric illness. Denies any prior psych medication or treatment. Denies any previous inpatient psych admissions. States [facility] and decided to leave 2 days ago. When asked why states "I was anxious to leave". Admits to relapsing on drugs the same day. States he last used heroin and cocaine 2 days ago with the intent to die. When questioned regarding withdrawal symptoms, denies any s/s of withdrawal. Again stated "I don't need anything right now" No s/s of withdrawal noted. Denies any hx of withdrawal seizures. Denies any alcohol use. States he smokes 1 pack of cigarettes a day. Patient refused to provide any collateral.    # Handoff  -Addiction consult ordered

## 2020-11-07 LAB
ALBUMIN SERPL ELPH-MCNC: 4.5 G/DL — SIGNIFICANT CHANGE UP (ref 3.5–5.2)
ALP SERPL-CCNC: 102 U/L — SIGNIFICANT CHANGE UP (ref 30–115)
ALT FLD-CCNC: 34 U/L — SIGNIFICANT CHANGE UP (ref 0–41)
ANION GAP SERPL CALC-SCNC: 14 MMOL/L — SIGNIFICANT CHANGE UP (ref 7–14)
AST SERPL-CCNC: 17 U/L — SIGNIFICANT CHANGE UP (ref 0–41)
BILIRUB SERPL-MCNC: 0.2 MG/DL — SIGNIFICANT CHANGE UP (ref 0.2–1.2)
BUN SERPL-MCNC: 15 MG/DL — SIGNIFICANT CHANGE UP (ref 10–20)
CALCIUM SERPL-MCNC: 9.1 MG/DL — SIGNIFICANT CHANGE UP (ref 8.5–10.1)
CHLORIDE SERPL-SCNC: 104 MMOL/L — SIGNIFICANT CHANGE UP (ref 98–110)
CO2 SERPL-SCNC: 26 MMOL/L — SIGNIFICANT CHANGE UP (ref 17–32)
CREAT SERPL-MCNC: 0.7 MG/DL — SIGNIFICANT CHANGE UP (ref 0.7–1.5)
GLUCOSE SERPL-MCNC: 139 MG/DL — HIGH (ref 70–99)
POTASSIUM SERPL-MCNC: 4.4 MMOL/L — SIGNIFICANT CHANGE UP (ref 3.5–5)
POTASSIUM SERPL-SCNC: 4.4 MMOL/L — SIGNIFICANT CHANGE UP (ref 3.5–5)
PROT SERPL-MCNC: 6.4 G/DL — SIGNIFICANT CHANGE UP (ref 6–8)
SODIUM SERPL-SCNC: 144 MMOL/L — SIGNIFICANT CHANGE UP (ref 135–146)

## 2020-11-07 PROCEDURE — 99231 SBSQ HOSP IP/OBS SF/LOW 25: CPT

## 2020-11-07 RX ADMIN — Medication 50 MILLIGRAM(S): at 20:42

## 2020-11-07 NOTE — PROGRESS NOTE BEHAVIORAL HEALTH - NSBHCHARTREVIEWVS_PSY_A_CORE FT
Vital Signs Last 24 Hrs  T(C): 35.9 (07 Nov 2020 08:31), Max: 35.9 (07 Nov 2020 08:31)  T(F): 96.7 (07 Nov 2020 08:31), Max: 96.7 (07 Nov 2020 08:31)  HR: 84 (07 Nov 2020 08:31) (68 - 84)  BP: 131/81 (07 Nov 2020 08:31) (124/83 - 131/81)  BP(mean): --  RR: 16 (07 Nov 2020 08:31) (16 - 20)  SpO2: --

## 2020-11-07 NOTE — PROGRESS NOTE BEHAVIORAL HEALTH - NSBHFUPINTERVALHXFT_PSY_A_CORE
Patient seen and evaluated, chart reviewed. As per unit staff, there were no acute behavioral issues overnight. Patient was seen in his room. Today patient alert and oriented x3, bette and cooperative. Pt reports that his mood has been getting better. He denies current suicidal ideations or HI. Denies any past suicide attempts. Denies A/V hallucinations. No evidence of psychosis noted. Patient denies any s/s consistent with richie. Pt denies Sx of drug/etoh withdrawal and no objective sx of withdrawal were appreciated. DIscussed MMMT/Suboxone options, PT is not interested at this time.

## 2020-11-07 NOTE — PROGRESS NOTE BEHAVIORAL HEALTH - SUMMARY
Patient is a 31 yo male, psychiatric history of no formal dx, no hx of admissions, self harm or SA, significant substance use history of cocaine and heroin use disorders, reports relapse 2 days ago after 1 month of sobriety, got out of detox and reports he used a 'speedball' of cocaine and heroin, legal problems related to substance use (reports has court dates in drug court upcoming, doesn't know dates), hx of incarceration last year for probation violation, reports got into fights when in prison but no other known hx of aggression, no know trauma hx, formerly employed as a musician prior to covid, BIB self reporting SI to overdose on heroin. He reports mood and anxiety symptoms since coming out of [facility] rehab 2 days ago and SI with thought to overdose on heroin. He is willing to sign for voluntary inpatient admission    Patient presents as irritable, endorsing increased depression and anxiety. Patient now denies suicidal/homicidal ideations. Patient is not currently connected with outpatient treatment. Although there may be some suspicion of secondary gain, given patients increase depression and anxiety, recent relapse on substances, inability to cope with psychosocial stressors, recent suicidal ideation with plan and intent, patient would benefit from IPP for safety and stabilization.    #Admit Voluntary 9:13    Plan:    #SIMD vs MDD  -Patient declined meds at this time    # Tobacco use disorder  -Nicotine gum PRN    -Haldol 5mg Q6 PRN  -Hydroxyzine 50mg Q6 PRN    # Handoff  -Addiction consult ordered

## 2020-11-08 LAB — TSH SERPL-MCNC: 0.08 UIU/ML — LOW (ref 0.27–4.2)

## 2020-11-08 RX ADMIN — Medication 50 MILLIGRAM(S): at 20:14

## 2020-11-08 NOTE — PROGRESS NOTE BEHAVIORAL HEALTH - NSBHCHARTREVIEWVS_PSY_A_CORE FT
Vital Signs Last 24 Hrs  T(C): 36.4 (08 Nov 2020 17:23), Max: 36.4 (08 Nov 2020 17:23)  T(F): 97.5 (08 Nov 2020 17:23), Max: 97.5 (08 Nov 2020 17:23)  HR: 73 (08 Nov 2020 17:23) (71 - 87)  BP: 123/73 (08 Nov 2020 17:23) (123/73 - 139/84)  BP(mean): --  RR: 16 (08 Nov 2020 17:23) (16 - 18)  SpO2: --

## 2020-11-08 NOTE — CONSULT NOTE ADULT - ASSESSMENT
Opioid dependency  s/p Rehab        Counseling done  No w/d issues  Managementb per IPP  F/U at [facility] outpt  per pt.
30/M with depression, nicotine dependance admitted for suicidal ideation with plan.       1. medical management   no active medical issues at this time   continue current psych management     2. nicotine dependance   nicotine patch     - Medicine will sign-off. Please call with further questions.

## 2020-11-08 NOTE — PROGRESS NOTE BEHAVIORAL HEALTH - SUMMARY
Patient is a 29 yo male, psychiatric history of no formal dx, no hx of admissions, self harm or SA, significant substance use history of cocaine and heroin use disorders, reports relapse 2 days ago after 1 month of sobriety, got out of detox and reports he used a 'speedball' of cocaine and heroin, legal problems related to substance use (reports has court dates in drug court upcoming, doesn't know dates), hx of incarceration last year for probation violation, reports got into fights when in retirement but no other known hx of aggression, no know trauma hx, formerly employed as a musician prior to covid, BIB self reporting SI to overdose on heroin. He reports mood and anxiety symptoms since coming out of [facility] rehab 2 days ago and SI with thought to overdose on heroin. He is willing to sign for voluntary inpatient admission    Patient presents as irritable, endorsing increased depression and anxiety. Patient now denies suicidal/homicidal ideations. Patient is not currently connected with outpatient treatment. Although there may be some suspicion of secondary gain, given patients increase depression and anxiety, recent relapse on substances, inability to cope with psychosocial stressors, recent suicidal ideation with plan and intent, patient would benefit from IPP for safety and stabilization.    #Admit Voluntary 9:13    Plan:    #SIMD vs MDD  -Patient declined meds at this time    # Tobacco use disorder  -Nicotine gum PRN    -Haldol 5mg Q6 PRN  -Hydroxyzine 50mg Q6 PRN    # Handoff  -Addiction consult ordered

## 2020-11-08 NOTE — CONSULT NOTE ADULT - SUBJECTIVE AND OBJECTIVE BOX
Detox consult    Pt seen and chart reviewed  Pt released from drug rehab few days prior, [facility].  Had a mini relapse  after getting out  But denies any w/d symptoms  H/O depression      SOCIAL HISTORY: n/a    REVIEW OF SYSTEMS:    Constitutional: No fever, weight loss or fatigue  ENT:  No difficulty hearing, tinnitus, vertigo; No sinus or throat pain  Neck: No pain or stiffness  Respiratory: No cough, wheezing, chills or hemoptysis  Cardiovascular: No chest pain, palpitations, shortness of breath, dizziness or leg swelling  Gastrointestinal: No abdominal or epigastric pain. No nausea, vomiting or hematemesis; No diarrhea or constipation. No melena or hematochezia.  Neurological: No headaches, memory loss, loss of strength, numbness or tremors  Musculoskeletal: No joint pain or swelling; No muscle, back or extremity pain  Psychiatric: No depression, anxiety, mood swings or difficulty sleeping    MEDICATIONS  (STANDING):  influenza   Vaccine 0.5 milliLiter(s) IntraMuscular once    MEDICATIONS  (PRN):  haloperidol     Tablet 5 milliGRAM(s) Oral every 6 hours PRN Agitation  hydrOXYzine hydrochloride 50 milliGRAM(s) Oral every 6 hours PRN Anxiety/insomnia  nicotine  Polacrilex Gum 2 milliGRAM(s) Oral every 2 hours PRN Smoking Cessation      Vital Signs Last 24 Hrs  T(C): 36.2 (08 Nov 2020 08:11), Max: 36.8 (07 Nov 2020 16:37)  T(F): 97.2 (08 Nov 2020 08:11), Max: 98.2 (07 Nov 2020 16:37)  HR: 87 (08 Nov 2020 08:11) (71 - 87)  BP: 139/84 (08 Nov 2020 08:11) (114/68 - 139/84)  BP(mean): --  RR: 18 (08 Nov 2020 08:11) (16 - 18)  SpO2: --    PHYSICAL EXAM:    Constitutional: NAD, well-groomed, well-developed  HEENT: PERRLA, EOMI, Normal Hearing, MMM  Neck: No LAD, No JVD  Back: Normal spine flexure, No CVA tenderness  Respiratory: CTAB/L  Cardiovascular: S1 and S2, RRR, no M/G/R  Gastrointestinal: BS+, soft, NT/ND  Extremities: No peripheral edema  Vascular: 2+ peripheral pulses  Neurological: A/O x 3, no focal deficits    LABS:    11-07    144  |  104  |  15  ----------------------------<  139<H>  4.4   |  26  |  0.7    Ca    9.1      07 Nov 2020 08:24    TPro  6.4  /  Alb  4.5  /  TBili  0.2  /  DBili  x   /  AST  17  /  ALT  34  /  AlkPhos  102  11-07        Drug Screen Urine:  Alcohol Level  Alcohol, Blood: <10 mg/dL (11-05-20 @ 19:58)        RADIOLOGY & ADDITIONAL STUDIES: n/a

## 2020-11-09 DIAGNOSIS — F19.94 OTHER PSYCHOACTIVE SUBSTANCE USE, UNSPECIFIED WITH PSYCHOACTIVE SUBSTANCE-INDUCED MOOD DISORDER: ICD-10-CM

## 2020-11-09 PROCEDURE — 99231 SBSQ HOSP IP/OBS SF/LOW 25: CPT

## 2020-11-09 RX ADMIN — Medication 50 MILLIGRAM(S): at 20:31

## 2020-11-09 NOTE — DISCHARGE NOTE BEHAVIORAL HEALTH - NSBHDCALCOHOLREFERFT_PSY_A_CORE
[facility] outpatient Dual Focus services

## 2020-11-09 NOTE — DISCHARGE NOTE BEHAVIORAL HEALTH - NSBHDCVIOLFCTROTHERFT_PSY_A_CORE
Patient and provider identified future stressors as being psychosocial stressors, non compliance with outpatient follow up, substance use

## 2020-11-09 NOTE — PROGRESS NOTE BEHAVIORAL HEALTH - NSBHADMITDANGERSELF_PSY_A_CORE
suicidal behavior/suicidal ideation with plan and means

## 2020-11-09 NOTE — PROGRESS NOTE BEHAVIORAL HEALTH - RISK ASSESSMENT
reports recent SI with plan to overdose on heroin, substance use, inability to cope with psychosocial stressors, lack of support system.

## 2020-11-09 NOTE — DISCHARGE NOTE BEHAVIORAL HEALTH - NSBHDCADMRISKMITFT_PSY_A_CORE
Patient is future oriented and optimistic about starting Outpatient. Patient verbalizes reasons for living. Patient able to contact for safety. Patient is agreeable that should he have any thoughts of hurting himself or others, he will call 911, return to the ED or call the National Suicide Prevention Lifeline, immediately.

## 2020-11-09 NOTE — DISCHARGE NOTE BEHAVIORAL HEALTH - NSBHDCSWCOMMENTSFT_PSY_A_CORE
Discharge information faxed to the next level of care-[facility]-[phone] Discharge information faxed to the next level of care-[facility]-[phone] on 11/10/20 at 10:50 am

## 2020-11-09 NOTE — DISCHARGE NOTE BEHAVIORAL HEALTH - HPI (INCLUDE ILLNESS QUALITY, SEVERITY, DURATION, TIMING, CONTEXT, MODIFYING FACTORS, ASSOCIATED SIGNS AND SYMPTOMS)
Has The Growth Been Previously Biopsied?: has been previously biopsied Patient is a 29 yo male, psychiatric history of no formal dx, no hx of admissions, self harm or SA, significant substance use history of cocaine and heroin use disorders, reports relapse 2 days ago after 1 month of sobriety, got out of detox and reports he used a 'speedball' of cocaine and heroin, legal problems related to substance use (reports has court dates in drug court upcoming, doesn't know dates), hx of incarceration last year for probation violation, reports got into fights when in residential but no other known hx of aggression, no know trauma hx, formerly employed as a musician prior to covid, BIB self reporting SI to overdose on heroin.    Patient reports feeling chronically suicidal 'since my 20s', reports that he is feeling anxious and depressed, and today had SI to overdose on heroin. He reports that sometimes the SI 'goes away, then comes on stronger.' He reports it 'came on strong a week ago.' He reports 'I'm always depressed, I can't sleep, my appetite is crazy, I can't focus, I'm anxious.' He reported he got out of rehab 2 days ago and that night took heroin and cocaine, he reports in an attempt to die. He reports stressof of having 'a lot of legal issues' and reports he was crying a lot today. He denies psychotic sx, HI, access to a gun. He is willing to sign for voluntary admission.    Patient seen and evaluated, chart reviewed. On approach patient laying in bed with cover over his head. Patient alert and oriented x3, irritable, minimally cooperative. States he was admitted for suicidal ideations with a plan an intent to OD on "a lot of drugs" Patient denies suicidal ideations at this time. Denies any past suicide attempts. Denies Homicidal ideations. Denies A/V hallucinations. No evidence of psychosis noted. Patient denies any s/s consistent with richie. Patient endorses recent increase in depression and anxiety. When questioned about depression and anxiety at this time states "its ok". When questioned about treating his depression and anxiety, patient declined at this time. Stated " I don't need anything right now" Patient states he has never been diagnosed with any psychiatric illness. Denies any prior psych medication or treatment. Denies any previous inpatient psych admissions. States he was recently at [facility] rehab and decided to leave 2 days ago. When asked why states "I was anxious to leave". Admits to relapsing on drugs the same day. States he last used heroin and cocaine 2 days ago with the intent to die. When questioned regarding withdrawal symptoms, denies any s/s of withdrawal. Again stated "I don't need anything right now" No s/s of withdrawal noted. Denies any hx of withdrawal seizures. Denies any alcohol use. States he smokes 1 pack of cigarettes a day. Patient refused to provide any collateral.    Patient seen and evaluated, in treatment team 11/9/20. chart reviewed. Per nursing report no acute events over night. On evaluation patient alert and oriented x3, calm and cooperative, appearing In good spirits. Patient requesting discharge. Patient denies suicidal ideations at this time. States no suicidal ideation over the weekend. Able to contact for safety stating he would talk to someone if suicidal ideations return. Patient denies depression and anxiety. Does not appear to be depressed or anxious. Not interested in starting any medication. States he will return to [facility] where he received services in the past. Not interested in going back to rehab. Patient denies homicidal ideations. Denies A/V hallucinations. No evidence of psychosis noted. Denies any s/s of richie. Denies any s/s of withdrawal. No s/s of withdrawal noted. Does not warrant continued Inpatient hospitalization. Patient is psychiatrically stable for discharge at this time. Patient does not present an imminent risk to self or others at this time. Anticipated discharge 11/10/20. Patient is a 29 yo male, psychiatric history of no formal dx, no hx of admissions, self harm or SA, significant substance use history of cocaine and heroin use disorders, reports relapse 2 days ago after 1 month of sobriety, got out of detox and reports he used a 'speedball' of cocaine and heroin, legal problems related to substance use (reports has court dates in drug court upcoming, doesn't know dates), hx of incarceration last year for probation violation, reports got into fights when in intermediate but no other known hx of aggression, no know trauma hx, formerly employed as a musician prior to covid, BIB self reporting SI to overdose on heroin.    Patient reports feeling chronically suicidal 'since my 20s', reports that he is feeling anxious and depressed, and today had SI to overdose on heroin. He reports that sometimes the SI 'goes away, then comes on stronger.' He reports it 'came on strong a week ago.' He reports 'I'm always depressed, I can't sleep, my appetite is crazy, I can't focus, I'm anxious.' He reported he got out of rehab 2 days ago and that night took heroin and cocaine, he reports in an attempt to die. He reports stress of having 'a lot of legal issues' and reports he was crying a lot today. He denies psychotic sx, HI, access to a gun. He is willing to sign for voluntary admission.    Patient seen and evaluated, chart reviewed. On approach patient laying in bed with cover over his head. Patient alert and oriented x3, irritable, minimally cooperative. States he was admitted for suicidal ideations with a plan an intent to OD on "a lot of drugs" Patient denies suicidal ideations at this time. Denies any past suicide attempts. Denies Homicidal ideations. Denies A/V hallucinations. No evidence of psychosis noted. Patient denies any s/s consistent with richie. Patient endorses recent increase in depression and anxiety. When questioned about depression and anxiety at this time states "its ok". When questioned about treating his depression and anxiety, patient declined at this time. Stated " I don't need anything right now" Patient states he has never been diagnosed with any psychiatric illness. Denies any prior psych medication or treatment. Denies any previous inpatient psych admissions. States he was recently at [facility] rehab and decided to leave 2 days ago. When asked why states "I was anxious to leave". Admits to relapsing on drugs the same day. States he last used heroin and cocaine 2 days ago with the intent to die. When questioned regarding withdrawal symptoms, denies any s/s of withdrawal. Again stated "I don't need anything right now" No s/s of withdrawal noted. Denies any hx of withdrawal seizures. Denies any alcohol use. States he smokes 1 pack of cigarettes a day. Patient refused to provide any collateral.    Patient seen and evaluated, in treatment team 11/9/20. chart reviewed. Per nursing report no acute events over night. On evaluation patient alert and oriented x3, calm and cooperative, appearing In good spirits. Patient requesting discharge. Patient denies suicidal ideations at this time. States no suicidal ideation over the weekend. Able to contact for safety stating he would talk to someone if suicidal ideations return. Patient denies depression and anxiety. Does not appear to be depressed or anxious. Not interested in starting any medication. States he will return to [facility] where he received services in the past. Not interested in going back to rehab. Patient denies homicidal ideations. Denies A/V hallucinations. No evidence of psychosis noted. Denies any s/s of richie. Denies any s/s of withdrawal. No s/s of withdrawal noted. Does not warrant continued Inpatient hospitalization. Patient is psychiatrically stable for discharge at this time. Patient does not present an imminent risk to self or others at this time. Anticipated discharge 11/10/20.

## 2020-11-09 NOTE — PROGRESS NOTE BEHAVIORAL HEALTH - NSBHCHARTREVIEWVS_PSY_A_CORE FT
Vital Signs Last 24 Hrs  T(C): 35.9 (06 Nov 2020 06:07), Max: 36.1 (05 Nov 2020 21:48)  T(F): 96.6 (06 Nov 2020 06:07), Max: 97 (05 Nov 2020 21:48)  HR: 88 (05 Nov 2020 21:48) (88 - 93)  BP: 136/76 (05 Nov 2020 21:48) (136/76 - 159/98)  BP(mean): --  RR: 18 (06 Nov 2020 06:07) (18 - 18)  SpO2: 98% (05 Nov 2020 21:48) (98% - 99%) Vital Signs Last 24 Hrs  T(C): 36.1 (09 Nov 2020 08:13), Max: 36.4 (08 Nov 2020 17:23)  T(F): 97 (09 Nov 2020 08:13), Max: 97.5 (08 Nov 2020 17:23)  HR: 69 (09 Nov 2020 08:13) (59 - 73)  BP: 131/76 (09 Nov 2020 08:13) (123/73 - 131/76)  BP(mean): --  RR: 18 (09 Nov 2020 08:13) (16 - 18)  SpO2: --

## 2020-11-09 NOTE — PROGRESS NOTE BEHAVIORAL HEALTH - SUMMARY
Patient is a 31 yo male, psychiatric history of no formal dx, no hx of admissions, self harm or SA, significant substance use history of cocaine and heroin use disorders, reports relapse 2 days ago after 1 month of sobriety, got out of detox and reports he used a 'speedball' of cocaine and heroin, legal problems related to substance use (reports has court dates in drug court upcoming, doesn't know dates), hx of incarceration last year for probation violation, reports got into fights when in [facility] but no other known hx of aggression, no know trauma hx, formerly employed as a musician prior to covid, BIB self reporting SI to overdose on heroin. He reports mood and anxiety symptoms since coming out of [facility] rehab 2 days ago and SI with thought to overdose on heroin. He is willing to sign for voluntary inpatient admission    Patient presents as irritable, endorsing increased depression and anxiety. Patient now denies suicidal/homicidal ideations. Patient is not currently connected with outpatient treatment. Although there may be some suspicion of secondary gain, given patients increase depression and anxiety, recent relapse on substances, inability to cope with psychosocial stressors, recent suicidal ideation with plan and intent, patient would benefit from IPP for safety and stabilization.    Patient requesting discharge. Patient denies suicidal ideations at this time. Patient denies depression and anxiety. Does not appear to be depressed or anxious. Not interested in starting any medication.  Patient denies homicidal ideations. Denies A/V hallucinations. No evidence of psychosis noted. Denies any s/s of richie. Denies any s/s of withdrawal. No s/s of withdrawal noted. Does not warrant continued Inpatient hospitalization. Patient is psychiatrically stable for discharge at this time. Patient does not present an imminent risk to self or others at this time. Anticipated discharge tomorrow 11/10/20.      #Admit Voluntary 9:13    Plan:    #SIMD vs MDD  -Patient declined meds at this time    # Tobacco use disorder  -Nicotine gum PRN    -Haldol 5mg Q6 PRN  -Hydroxyzine 50mg Q6 PRN    #Handoff  -Anticipated discharge tomorrow 11/10/20. Patient is a 29 yo male, psychiatric history of no formal dx, no hx of admissions, self harm or SA, significant substance use history of cocaine and heroin use disorders, reports relapse 2 days ago after 1 month of sobriety, got out of detox and reports he used a 'speedball' of cocaine and heroin, legal problems related to substance use (reports has court dates in drug court upcoming, doesn't know dates), hx of incarceration last year for probation violation, reports got into fights when in [facility] but no other known hx of aggression, no know trauma hx, formerly employed as a musician prior to covid, BIB self reporting SI to overdose on heroin. He reports mood and anxiety symptoms since coming out of [facility] rehab 2 days ago and SI with thought to overdose on heroin. He is willing to sign for voluntary inpatient admission    Patient presents as irritable, endorsing increased depression and anxiety. Patient now denies suicidal/homicidal ideations. Patient is not currently connected with outpatient treatment. Although there may be some suspicion of secondary gain, given patients increase depression and anxiety, recent relapse on substances, inability to cope with psychosocial stressors, recent suicidal ideation with plan and intent, patient would benefit from IPP for safety and stabilization.    Patient requesting discharge. Patient denies suicidal ideations at this time. Patient denies depression and anxiety. Does not appear to be depressed or anxious. Not interested in starting any medication.  Patient denies homicidal ideations. Denies A/V hallucinations. No evidence of psychosis noted. Denies any s/s of richie. Denies any s/s of withdrawal. No s/s of withdrawal noted. Does not warrant continued Inpatient hospitalization. Patient is psychiatrically stable for discharge at this time. Patient does not present an imminent risk to self or others at this time. Anticipated discharge tomorrow 11/10/20.      #Admit Voluntary 9:13    Plan:    #SIMD   -Patient declined meds at this time    # Tobacco use disorder  -Nicotine gum PRN    -Haldol 5mg Q6 PRN  -Hydroxyzine 50mg Q6 PRN    #Handoff  -Anticipated discharge tomorrow 11/10/20.

## 2020-11-09 NOTE — DISCHARGE NOTE BEHAVIORAL HEALTH - NSTOBACCOREFERRAL_GEN_A_NCS
Yes Patient registered and referred to the [place] [facility]. Phone appointment scheduled for 11/11/2020/Yes

## 2020-11-09 NOTE — PROGRESS NOTE BEHAVIORAL HEALTH - NSBHATTESTSEENBY_PSY_A_CORE
attending Psychiatrist without NP/Trainee
attending Psychiatrist without NP/Trainee
NP without Attending Psychiatrist
NP without Attending Psychiatrist

## 2020-11-09 NOTE — PROGRESS NOTE BEHAVIORAL HEALTH - NSBHCHARTREVIEWINVESTIGATE_PSY_A_CORE FT
< from: 12 Lead ECG (11.06.20 @ 02:35) >    Ventricular Rate 65 BPM    Atrial Rate 65 BPM    P-R Interval 192 ms    QRS Duration 90 ms    Q-T Interval 406 ms    QTC Calculation(Bazett) 422 ms    P Axis 64 degrees    R Axis 83 degrees    T Axis 42 degrees    Diagnosis Line Normal sinus rhythm with sinus arrhythmia  Normal ECG
< from: 12 Lead ECG (11.06.20 @ 02:35) >    Ventricular Rate 65 BPM    Atrial Rate 65 BPM    P-R Interval 192 ms    QRS Duration 90 ms    Q-T Interval 406 ms    QTC Calculation(Bazett) 422 ms    P Axis 64 degrees    R Axis 83 degrees    T Axis 42 degrees    Diagnosis Line Normal sinus rhythm with sinus arrhythmia  Normal ECG
< from: 12 Lead ECG (11.06.20 @ 02:35) >    Ventricular Rate 65 BPM    Atrial Rate 65 BPM    P-R Interval 192 ms    QRS Duration 90 ms    Q-T Interval 406 ms    QTC Calculation(Bazett) 422 ms    P Axis 64 degrees    R Axis 83 degrees    T Axis 42 degrees    Diagnosis Line Normal sinus rhythmwith sinus arrhythmia  Normal ECG
< from: 12 Lead ECG (11.06.20 @ 02:35) >    Ventricular Rate 65 BPM    Atrial Rate 65 BPM    P-R Interval 192 ms    QRS Duration 90 ms    Q-T Interval 406 ms    QTC Calculation(Bazett) 422 ms    P Axis 64 degrees    R Axis 83 degrees    T Axis 42 degrees    Diagnosis Line Normal sinus rhythmwith sinus arrhythmia  Normal ECG

## 2020-11-09 NOTE — DISCHARGE NOTE BEHAVIORAL HEALTH - NSBHDCDXVALIDYESFT_PSY_A_CORE
Primary Dx Substance use disorder F19.90. Secondary Dx 1 Tobacco use disorder F17.200. Primary Dx Substance induced mood disorder F19.94.Secondary Dx 1 Substance use disorder F19.90.Secondary Dx 2 Tobacco use disorder F17.200.

## 2020-11-09 NOTE — PROGRESS NOTE BEHAVIORAL HEALTH - NSBHFUPINTERVALHXFT_PSY_A_CORE
Patient seen and evaluated, in treatment team. chart reviewed. Per nursing report no acute events over night. On evaluation patient alert and oriented x3, calm and cooperative, appearing In good spirits. Patient requesting discharge. Patient denies suicidal ideations at this time. States no suicidal ideation over the weekend. Able to contact for safety stating he would talk to someone if suicidal ideations return. Patient denies depression and anxiety. Does not appear to be depressed or anxious. Not interested in starting any medication. States he will return to [facility] where he received services in the past. Not interested in going back to rehab. Patient denies homicidal ideations. Denies A/V hallucinations. No evidence of psychosis noted. Denies any s/s of richie. Denies any s/s of withdrawal. No s/s of withdrawal noted. Does not warrant continued Inpatient hospitalization. Patient is psychiatrically stable for discharge at this time. Patient does not present an imminent risk to self or others at this time. Anticipated discharge tomorrow 11/10/20.    #Handoff  -Anticipated discharge tomorrow 11/10/20. Patient seen and evaluated, in treatment team. chart reviewed. Per nursing report no acute events over night. On evaluation patient alert and oriented x3, calm and cooperative, appearing In good spirits. Patient requesting discharge. Patient denies suicidal ideations at this time. States no suicidal ideation over the weekend. Able to contact for safety stating he would talk to someone if suicidal ideations return. Patient denies depression and anxiety. Does not appear to be depressed or anxious. Not interested in starting any medication. States he will return to [facility] where he received services in the past. Not interested in going back to rehab. States he rents a room at [address]. Patient denies homicidal ideations. Denies A/V hallucinations. No evidence of psychosis noted. Denies any s/s of richie. Denies any s/s of withdrawal. No s/s of withdrawal noted. Does not warrant continued Inpatient hospitalization. Patient is psychiatrically stable for discharge at this time. Patient does not present an imminent risk to self or others at this time. Anticipated discharge tomorrow 11/10/20.    Writer reviewed note from addiction medicine. No new interventions at this time.    #Handoff  -Anticipated discharge tomorrow 11/10/20.

## 2020-11-09 NOTE — DISCHARGE NOTE BEHAVIORAL HEALTH - NSBHDCCONDITIONFT_PSY_A_CORE
Does not warrant continued Inpatient hospitalization.   Patient is psychiatrically stable for discharge at this time. Patient does not present an imminent risk to self or others at this time.

## 2020-11-10 ENCOUNTER — TRANSCRIPTION ENCOUNTER (OUTPATIENT)
Age: 30
End: 2020-11-10

## 2020-11-10 VITALS
DIASTOLIC BLOOD PRESSURE: 64 MMHG | TEMPERATURE: 99 F | SYSTOLIC BLOOD PRESSURE: 135 MMHG | HEART RATE: 90 BPM | RESPIRATION RATE: 16 BRPM

## 2020-11-10 PROCEDURE — 99238 HOSP IP/OBS DSCHRG MGMT 30/<: CPT

## 2020-11-10 NOTE — DISCHARGE NOTE NURSING/CASE MANAGEMENT/SOCIAL WORK - NSDCPEWEB_GEN_ALL_CORE
[facility] for Tobacco Control website --- [email or web address] website --- [email or web address]

## 2020-11-10 NOTE — CHART NOTE - NSCHARTNOTEFT_GEN_A_CORE
D/C today. Patients will take public transportation. metro Cards given. Patient appeared to be in good spirits today. Endorses a better mood. Denies suicidal/ homicidal ideations. Patient able to contract for safety, stating the importance of compliance with Outpatient treatment. Does not warrant continued Inpatient hospitalization. Writer, RN reviewed D/C papers with patient. Patient verbalized understanding. Patient does not appear to be of imminent danger to self or others at this time.

## 2020-11-10 NOTE — DISCHARGE NOTE NURSING/CASE MANAGEMENT/SOCIAL WORK - PATIENT PORTAL LINK FT
You can access the FollowMyHealth Patient Portal offered by [facility] by registering at the following website: [email or web address]. By joining [facility]’s FollowMyHealth portal, you will also be able to view your health information using other applications (apps) compatible with our system.

## 2020-11-10 NOTE — DISCHARGE NOTE NURSING/CASE MANAGEMENT/SOCIAL WORK - NSDCPEEMAIL_GEN_ALL_CORE
[facility] for Tobacco Control email [email or web address]

## 2020-11-10 NOTE — CHART NOTE - NSCHARTNOTEFT_GEN_A_CORE
Pt. is for discharge on this date.  He presents with no acute symptoms at this time.  Pt. denies any suicidal or homicidal ideations or any A/V hallucinations.  Pt. has remained compliant with his medication and has been active and present on the unit.  He states he is ready for discharge on this date.    Pt. will return to his home in the community where he rents a private room ([address] [place], [place] [id number]).  He will follow up with his current outpatient provider, [facility] for outpatient [facility] treatment.  Pt. is aware and agreeable to this plan.

## 2020-11-13 DIAGNOSIS — F11.99 OPIOID USE, UNSPECIFIED WITH UNSPECIFIED OPIOID-INDUCED DISORDER: ICD-10-CM

## 2020-11-13 DIAGNOSIS — Z53.20 PROCEDURE AND TREATMENT NOT CARRIED OUT BECAUSE OF PATIENT'S DECISION FOR UNSPECIFIED REASONS: ICD-10-CM

## 2020-11-13 DIAGNOSIS — F19.94 OTHER PSYCHOACTIVE SUBSTANCE USE, UNSPECIFIED WITH PSYCHOACTIVE SUBSTANCE-INDUCED MOOD DISORDER: ICD-10-CM

## 2020-11-13 DIAGNOSIS — F14.10 COCAINE ABUSE, UNCOMPLICATED: ICD-10-CM

## 2020-11-13 DIAGNOSIS — R45.851 SUICIDAL IDEATIONS: ICD-10-CM

## 2020-11-13 DIAGNOSIS — F17.210 NICOTINE DEPENDENCE, CIGARETTES, UNCOMPLICATED: ICD-10-CM

## 2020-11-13 DIAGNOSIS — F41.9 ANXIETY DISORDER, UNSPECIFIED: ICD-10-CM

## 2021-08-16 NOTE — H&P ADULT - OPHTHALMOLOGIC
General Sunscreen Counseling: I recommended a broad spectrum sunscreen with a SPF of 30 or higher.  I explained that SPF 30 sunscreens block approximately 97 percent of the sun's harmful rays.  Sunscreens should be applied at least 15 minutes prior to expected sun exposure and then every 2 hours after that as long as sun exposure continues. If swimming or exercising sunscreen should be reapplied every 45 minutes to an hour after getting wet or sweating.  One ounce, or the equivalent of a shot glass full of sunscreen, is adequate to protect the skin not covered by a bathing suit. I also recommended a lip balm with a sunscreen. Sun protective clothing can be used in lieu of sunscreen but must be worn the entire time you are exposed to the sun's rays. Detail Level: Zone negative

## 2022-01-27 ENCOUNTER — TRANSCRIPTION ENCOUNTER (OUTPATIENT)
Age: 32
End: 2022-01-27

## 2022-07-09 NOTE — DISCHARGE NOTE BEHAVIORAL HEALTH - NSBHDCSUICSAFETYFT_PSY_A_CORE
Patient has no objection to blood transfusions.
Safety planning was addressed during the course of the hospitalization. Patient actively participated in safety planning and was given a copy. Patient was able to identify situation in which to use it.

## 2023-02-07 NOTE — DISCHARGE NOTE BEHAVIORAL HEALTH - PHYSICIAN SECTION COMPLETE
Yes Detail Level: Simple Price (Do Not Change): 0.00 Instructions: This plan will send the code FBSD to the PM system.  DO NOT or CHANGE the price.

## 2023-04-14 NOTE — H&P ADULT - ATTENDING COMMENTS
Called Matt re: rash. Relayed that Clara has sent in a prescription for hydroxizine, an antihistamine, to help with the itching. Recommended using aquaphor on the rash as needed, being sure to keep the ointment away from the incision. Patient should shower daily, allowing the soapy water to run over the rash and incision, keep the area clean and dry, and wear a clean shirt between the incision/rash and brace. Site should remain open to air. Matt verbalized understanding and states regardless of the rash it's definitely been worth it as his back has never felt better. Matt understands not to overdo any activity and states he is very happy with the outcome so far. Matt has no further questions or concerns at this time.   as above

## 2023-12-21 NOTE — PROGRESS NOTE BEHAVIORAL HEALTH - NS ED BHA MED ROS GENITOURINARY
Clonazepam last 11/10/2023 ok to refill  Authorized electronic refill.  Confirmed and checked database today.  UTD and compliant with screening.    
No complaints

## 2024-08-19 NOTE — DISCHARGE NOTE NURSING/CASE MANAGEMENT/SOCIAL WORK - NURSING SECTION COMPLETE
Patient/Caregiver provided printed discharge information.
Chronic back pain- Hx of Lumbar stenosis with b/l LE radiculopathy/lumbar spine

## 2025-05-27 NOTE — PATIENT PROFILE BEHAVIORAL HEALTH - SPIRITUAL, ILLNESS/HOSPITALIZATION AFFECT DREAMS/GOALS/PLANS, PROFILE
Physical Therapy Treatment      Patient Name: Alanna Del Angel  MRN: [id number]  Today's Date: 5/27/2025  Visit #4  Time Calculation  Start Time: 0902  Stop Time: 0930  Time Calculation (min): 28 min    Insurance:  2025 [employer] AFTER 30 VS VERNELL YR     Assessment:  Pt had good tolerance to the addition of ankle weights last aquatic session, continued with 2# ankle weights this session.  No rest break needed throughout the session.  Pt would continue to benefit from aquatic therapy for POTS related symptoms, endurance deficits and generalized weakness.    Patient's response to session: No change in pain, Increased ROM/joint mobility, Increase motor control, and Increased knowledge and understanding  Aquatic PT is required due to, buoyancy of water reduced weight bearing and decreased LE and spinal loading, allowing for more freedom of movement and promotes improved ability to perform functional tasks.  The viscosity of water which is more than 700x that of air, allowed increased reaction time, in turn allowing advanced balance activities to be safely performed and allow patient to be challenged beyond limited of stability without fear of falling; provides the opportunity to work on balance in a safe environment.  The hydrostatic pressure of water facilitates the reduction of edema in the lower extremities, allowing for greater ease of movement.  Aquatics d/t cardio vascular benefits including: improved cardiovascular efficiency including increased stroke volume, decreased HR, and decreased blood pressure with increased cardiac output allowing patient to achieve cardiovascular training effect with less work load.   Water promotes a rapid return to movement after surgery due to a low impact environment.  Aquatics to support upright posture during postural retraining and strengthening.   Aquatics allow patient to work on gait with less assistance or without assistive device improving posture and ease of gait  "training/conditioning.     Plan:  Continue per POC. Focus on dynamic core stabilization, hip and quad strength, as well as inc endurance.     When pt ready, planning on hybrid 2x/wk land and aquatic   Current Problem:   1. Weakness        2. Postural orthostatic tachycardia syndrome (POTS)        3. Other reduced mobility        4. Decreased functional mobility and endurance            Subjective     Pt reports, \"I felt pretty good after last session, I was tired but not exhausted to the point I didn't have any energy.  I also felt a little sore but a good soreness\"      Pain: NA       Objective       Treatments:  Therapeutic Exercise (57879):   minutes      Manual Therapy (10583):   minutes      Neuromuscular Re-education (97295):   minutes    Aquatic Therapy (82184):   30 Min 2 units  All exercises completed with 2# ankle weights  Forward walkin laps with current  Low back stretch at wall 30s 2x  Mini Squats: 2 x 10:  Lateral walking  2 x 10  Hip circles:    1 x 10 (B) (CW/CCW)  HS curls  1 x 10 (B)  Hip ABD  1 x 10 (B)  Hip FLX  1 x 10  Hip EXT (not completed this session)  1 x 10 (B)  Standing  (B UE support)  Standing Bicycle kicks:  1 x 10 (B)  Heel Raise:  20   Horizontal shoulder ABD/ADD: 20 x Closed hands  Shoulder ABD/ADD: 20 x closed hands  Shoulder FLX/EXT: 20 closed hand  Shoulder IR/ER: 20 Closed hands    Education and discussion on HEP and treatment regarding the benefits related to current condition, POC, pathophysiology, and precautions  " " I want to get better "